# Patient Record
Sex: MALE | Race: WHITE | URBAN - METROPOLITAN AREA
[De-identification: names, ages, dates, MRNs, and addresses within clinical notes are randomized per-mention and may not be internally consistent; named-entity substitution may affect disease eponyms.]

---

## 2021-07-16 ENCOUNTER — OFFICE VISIT (OUTPATIENT)
Dept: ORTHOPEDIC SURGERY | Age: 31
End: 2021-07-16
Payer: MEDICARE

## 2021-07-16 VITALS — HEIGHT: 68 IN | RESPIRATION RATE: 12 BRPM | BODY MASS INDEX: 33.95 KG/M2 | WEIGHT: 224 LBS | TEMPERATURE: 98 F

## 2021-07-16 DIAGNOSIS — T14.8XXA NERVE INJURY: ICD-10-CM

## 2021-07-16 DIAGNOSIS — S93.401A SPRAIN OF RIGHT ANKLE, UNSPECIFIED LIGAMENT, INITIAL ENCOUNTER: Primary | ICD-10-CM

## 2021-07-16 DIAGNOSIS — S93.491A HIGH ANKLE SPRAIN OF RIGHT LOWER EXTREMITY, INITIAL ENCOUNTER: ICD-10-CM

## 2021-07-16 DIAGNOSIS — T14.8XXA NERVE INJURY: Primary | ICD-10-CM

## 2021-07-16 DIAGNOSIS — S93.401A SPRAIN OF RIGHT ANKLE, UNSPECIFIED LIGAMENT, INITIAL ENCOUNTER: ICD-10-CM

## 2021-07-16 PROCEDURE — 4004F PT TOBACCO SCREEN RCVD TLK: CPT | Performed by: ORTHOPAEDIC SURGERY

## 2021-07-16 PROCEDURE — G8428 CUR MEDS NOT DOCUMENT: HCPCS | Performed by: ORTHOPAEDIC SURGERY

## 2021-07-16 PROCEDURE — 99204 OFFICE O/P NEW MOD 45 MIN: CPT | Performed by: ORTHOPAEDIC SURGERY

## 2021-07-16 PROCEDURE — G8417 CALC BMI ABV UP PARAM F/U: HCPCS | Performed by: ORTHOPAEDIC SURGERY

## 2021-07-16 RX ORDER — TRAMADOL HYDROCHLORIDE 50 MG/1
50 TABLET ORAL EVERY 6 HOURS PRN
Qty: 10 TABLET | Refills: 0 | Status: SHIPPED | OUTPATIENT
Start: 2021-07-16 | End: 2021-07-17

## 2021-07-16 NOTE — LETTER
Dr. Cameron Freedman [address]  [phone]        7/16/2021     Patient: Bonnee Saint  YOB: 1990    Dear Man Harding MD,    I had the pleasure of seeing one of your patients, Bonnee Saint recently in the office. Below are the relevant portions of my assessment and plan of care. ASSESSMENT AND PLAN:  He has a right foot/ankle injury, sustained on 7/8/2021. He does have significant physical exam findings, concerning for nerve injury and tendon injury, as well as findings concerning for a possible complex regional pain syndrome. Notably, he has the past medical history as above. He reports a history of tobacco use (smokes about 1 pack of cigarettes per day, as well as pipe/cigar, and e-cigarettes). We had a discussion today about the likely diagnosis and its natural history, physical exam and imaging findings, as well as various treatment options in detail. Surgically, we discussed a possible future surgery, depending on the results of his imaging as below. We did discuss that his risks are higher in the setting of tobacco use, and I did recommend tobacco cessation. Orders/referrals were placed as below at today's visit. The patient will avoid pain provoking activity. The patient was placed into an Ace wrap and a cam boot, and will remain nonweightbearing. In order to know exactly how to proceed with treatment (surgical versus nonsurgical, as well as how), a STAT MRI was ordered today to evaluate his foot/ankle. This is medically necessary to evaluate the structures in this area, for both diagnosis and treatment. We also had a discussion about the risk of blood clot and thromboembolic events.  The patient understands that there is an increased risk with surgery/immobilization, and understands nothing will completely eliminate the risk of DVT/PE's, and that any prophylactic medication does not substitute for early mobilization. Given his risk profile, I have recommended the following strategy to decrease the risk of blood clots, and the patient agrees and wishes to proceed:  Aspirin 325 mg PO q Day. All questions were answered and the above plan was agreed upon. The patient will return to clinic after the MRI has been obtained with right tibia x-rays. At his next visit, we will review his x-rays for a proximal fibula fracture, and review his MRI; depending on the results, we may consider an EMG/nerve conduction study and/or neurology/PM&R/pain management referral to possibly evaluate for an evolving complex regional pain syndrome. I look forward to serving you and your patients again in the future. Please don't hesitate to contact me at my mobile number .         Amira Maloney MD  Orthopedic Surgery

## 2021-07-16 NOTE — PROGRESS NOTES
[facility] [address] [facility]  [facility] Alexus Barroso  [address] [place] [address]  Dept: [phone]    Ambulatory Orthopedic Consult      CHIEF COMPLAINT:    Chief Complaint   Patient presents with    Ankle Pain     right ankle       HISTORY OF PRESENT ILLNESS:      The patient is a 33 y.o. male who is being seen for evaluation of the above, which began 7/8/2021 secondary to a twisting injury while hiking  . At today's visit, he is using a splint/cast.     History is obtained today from:   [x]  the patient     [x]  EMR     []  one family member/friend--     []  multiple family members/friends    []  other:      The patient is accompanied today by his girlfriend, and the entirety of the patient encounter is witnessed by a member of the office staff, Daniel Hale ATC. REVIEW OF SYSTEMS:  Constitutional: Negative for fever. HENT: Negative for tinnitus. Eyes: Negative for pain. Respiratory: Negative for shortness of breath. Cardiovascular: Negative for chest pain. Gastrointestinal: Negative for abdominal pain. Genitourinary: Negative for dysuria. Skin: Negative for rash. Neurological: Negative for headaches. Hematological: Does not bruise/bleed easily. Musculoskeletal: See HPI for pertinent positives     Past Medical History:    He  has a past medical history of Asthma and GERD (gastroesophageal reflux disease). Past Surgical History:    He  has a past surgical history that includes Appendectomy. Current Medications:     Current Outpatient Medications:     esomeprazole Magnesium (NEXIUM) 20 MG PACK, Take 20 mg by mouth daily. Prn, Disp: , Rfl:     sulfacetamide (BLEPH-10) 10 % ophthalmic solution, Place 2 drops into the right eye every 4 hours. , Disp: 10 mL, Rfl: 0    HYDROcodone-acetaminophen (NORCO) 5-325 MG per tablet, Take 1 tablet by mouth every 6 hours as needed for Pain., Disp: 20 tablet, Rfl: 0    ketorolac (ACULAR) 0.5 % ophthalmic solution, Place 1 drop into both eyes 4 times daily. , Disp: 1 Bottle, Rfl: 1     Allergies:    Patient has no known allergies. Family History:  family history is not on file. Social History:   Social History     Occupational History    Not on file   Tobacco Use    Smoking status: Not on file   Substance and Sexual Activity    Alcohol use: Not on file    Drug use: Not on file    Sexual activity: Not on file     Occupation: Not currently working, but reports that he has previously worked as a  and is a      OBJECTIVE:  Temp 98 °F (36.7 °C)   Resp 12   Ht 5' 8\" (1.727 m)   Wt 224 lb (101.6 kg)   BMI 34.06 kg/m²    Psych: alert and oriented to person, time, and place   Cardio:  well perfused extremities, no cyanosis   Resp:  normal respiratory effort  Musculoskeletal:    Affected lower extremity:    Vascular: Limb well perfused, compartments soft/compressible. Skin: No erythema/ulcers. Intact. Superficial abrasion over the lateral hindfoot.   Neurovascular Status: As below  Motion: As below  Tenderness to Palpation: Diffusely throughout the lateral ankle/hindfoot/midfoot/forefoot, diffusely throughout the forefoot from medial to lateral, proximal fibula  -Positive squeeze test for syndesmotic injury  -Decreased sensation to light touch on the plantar aspect of the foot, lateral aspect of the foot, and dorsal aspect of the foot  -Unable to dorsiflex or plantarflex at his ankle (no palpable gap palpated at the Achilles tendon, tibialis anterior is not definitively palpated), unable to actively invert/compa the foot  -Significant pain with minimal passive motion of the foot/ankle  -Foot is held in plantarflexion and inversion, unable to actively return the foot to neutral  -Knee range of motion normal, no tenderness to palpation at the knee      RADIOLOGY:   7/16/2021 FINDINGS:  Three views (AP, Mortise, and Lateral) of the right ankle and three views (AP, Oblique, Lateral) of the right foot were obtained in the office today and reviewed, revealing no acute fracture, dislocation, or radiopaque foreign body/tumor. IMPRESSION:  No acute fracture/dislocation. Electronically signed by Radha Bella MD      Relevant previous imaging reviewed, both imaging and report(s) as below:    No results found. ASSESSMENT AND PLAN:  Body mass index is 34.06 kg/m². He has a right foot/ankle injury, sustained on 7/8/2021. He does have significant physical exam findings, concerning for nerve injury and tendon injury, as well as findings concerning for a possible complex regional pain syndrome. The differential diagnosis I am considering also includes peroneal tendon rupture, syndesmotic injury, severe ankle sprain with superficial peroneal nerve injury, as well as knee injury with common peroneal nerve injury (although this seems much less likely, as he does not have any pain at his knee, with normal motion at this time). Notably, he has the past medical history as above. He reports a history of tobacco use (smokes about 1 pack of cigarettes per day, as well as pipe/cigar, and e-cigarettes). We had a discussion today about the likely diagnosis and its natural history, physical exam and imaging findings, as well as various treatment options in detail. Surgically, we discussed a possible future surgery, depending on the results of his imaging as below. We did discuss that his risks are higher in the setting of tobacco use, and I did recommend tobacco cessation. Orders/referrals were placed as below at today's visit. The patient will avoid pain provoking activity. The patient was placed into an Ace wrap and a cam boot, and will remain nonweightbearing. In order to know exactly how to proceed with treatment (surgical versus nonsurgical, as well as how), a STAT MRI was ordered today to evaluate his foot/ankle.  This is medically necessary to evaluate the structures in this area, for both diagnosis and treatment. We also had a discussion about the risk of blood clot and thromboembolic events. The patient understands that there is an increased risk with surgery/immobilization, and understands nothing will completely eliminate the risk of DVT/PE's, and that any prophylactic medication does not substitute for early mobilization. Given his risk profile, I have recommended the following strategy to decrease the risk of blood clots, and the patient agrees and wishes to proceed:  Aspirin 325 mg PO q Day.     -10 tablets of tramadol were ordered today per the patient's request, for acute pain control. The patient denies ever having had a seizure in the past.    All questions were answered and the above plan was agreed upon. The patient will return to clinic after the MRI has been obtained with right tibia x-rays. At his next visit, we will review his x-rays for a proximal fibula fracture, and review his MRI; depending on the results, we may consider an EMG/nerve conduction study and/or neurology/PM&R/pain management referral to possibly evaluate for an evolving complex regional pain syndrome. At the patient's next visit, depending on how the patient is doing and/or new imaging/labs results, we may consider the following options:    []  Lace up ankle     []  CAM boot         []  removable wrist brace     []  PT:        []  Wean out immobilization         []  Adv activity      []  Footmind        []  Spenco       []  Custom Orthotic:               []  [place] brace                    []  Rocker Bottom      []  Night splint    []  Heel cups        []  Strap        []  Toe gizmos    []  Topl        []  NSAIDs         []  Charly        []  Ref:         []  Stress Xray    []  CT        []  MRI  []  Inj:          []  Consider OR      []  Pick OR date    No follow-ups on file. No orders of the defined types were placed in this encounter.     Orders Placed This Encounter   Procedures    XR ANKLE RIGHT (MIN 3 VIEWS)     WEIGHT BEARING 3 VIEWS:  AP, MORTISE, LATERAL  Please include entire foot     Standing Status:   Future     Number of Occurrences:   1     Standing Expiration Date:   8/16/2021     Order Specific Question:   Reason for exam:     Answer:   eval alignment    XR FOOT RIGHT (MIN 3 VIEWS)     WEIGHTBEARING 3 views: AP, Oblique, Lateral     Standing Status:   Future     Number of Occurrences:   1     Standing Expiration Date:   8/16/2021     Order Specific Question:   Reason for exam:     Answer:   eval alignment    MRI FOOT RIGHT WO CONTRAST     MUST BE SCHEDULED AT [facility]  MUST BE DONE ON 3T MAGNET OR GREATER  Please have read by [facility] Radiologist     Standing Status:   Future     Standing Expiration Date:   7/16/2022     Order Specific Question:   Reason for exam:     Answer:   eval for tendon rupture/syndrome    MRI ANKLE RIGHT WO CONTRAST     MUST BE SCHEDULED AT [facility]  MUST BE DONE ON 3T MAGNET OR GREATER  Please have read by [facility] Radiologist     Standing Status:   Future     Standing Expiration Date:   7/16/2022     Order Specific Question:   Reason for exam:     Answer:   eval for tendon rupture/syndrome    XR EYE FOREIGN BODY     Standing Status:   Future     Standing Expiration Date:   7/16/2022     Order Specific Question:   Reason for exam:     Answer:   3T MRI         Annamarie Alegria MD  Orthopedic Surgery        Please excuse any typos/errors, as this note was created with the assistance of voice recognition software. While intending to generate a document that actually reflects the content of the visit, the document can still have some errors including those of syntax and sound-a-like substitutions which may escape proof reading. In such instances, actual meaning can be extrapolated by context.

## 2021-07-17 RX ORDER — ASPIRIN 325 MG
325 TABLET, DELAYED RELEASE (ENTERIC COATED) ORAL DAILY
Qty: 42 TABLET | Refills: 0 | Status: SHIPPED | OUTPATIENT
Start: 2021-07-17 | End: 2021-08-28

## 2021-07-17 RX ORDER — TRAMADOL HYDROCHLORIDE 50 MG/1
50 TABLET ORAL EVERY 6 HOURS PRN
Qty: 10 TABLET | Refills: 0 | Status: SHIPPED | OUTPATIENT
Start: 2021-07-17 | End: 2021-07-24

## 2021-07-19 ENCOUNTER — HOSPITAL ENCOUNTER (OUTPATIENT)
Dept: GENERAL RADIOLOGY | Age: 31
Discharge: HOME | End: 2021-07-21
Payer: MEDICARE

## 2021-07-19 ENCOUNTER — HOSPITAL ENCOUNTER (OUTPATIENT)
Age: 31
Discharge: HOME | End: 2021-07-21
Payer: MEDICARE

## 2021-07-19 DIAGNOSIS — S93.401A SPRAIN OF RIGHT ANKLE, UNSPECIFIED LIGAMENT, INITIAL ENCOUNTER: ICD-10-CM

## 2021-07-19 DIAGNOSIS — T14.8XXA NERVE INJURY: ICD-10-CM

## 2021-07-19 DIAGNOSIS — S93.491A HIGH ANKLE SPRAIN OF RIGHT LOWER EXTREMITY, INITIAL ENCOUNTER: ICD-10-CM

## 2021-07-19 PROCEDURE — 70030 X-RAY EYE FOR FOREIGN BODY: CPT

## 2021-07-20 DIAGNOSIS — S93.491A HIGH ANKLE SPRAIN OF RIGHT LOWER EXTREMITY, INITIAL ENCOUNTER: Primary | ICD-10-CM

## 2021-07-22 ENCOUNTER — HOSPITAL ENCOUNTER (OUTPATIENT)
Dept: MRI IMAGING | Age: 31
Discharge: HOME | End: 2021-07-24
Payer: MEDICARE

## 2021-07-22 ENCOUNTER — HOSPITAL ENCOUNTER (OUTPATIENT)
Dept: MRI IMAGING | Age: 31
Discharge: HOME | End: 2021-07-21
Payer: MEDICARE

## 2021-07-22 DIAGNOSIS — S93.401A SPRAIN OF RIGHT ANKLE, UNSPECIFIED LIGAMENT, INITIAL ENCOUNTER: ICD-10-CM

## 2021-07-22 DIAGNOSIS — T14.8XXA NERVE INJURY: ICD-10-CM

## 2021-07-22 DIAGNOSIS — S93.491A HIGH ANKLE SPRAIN OF RIGHT LOWER EXTREMITY, INITIAL ENCOUNTER: ICD-10-CM

## 2021-07-22 PROCEDURE — 73718 MRI LOWER EXTREMITY W/O DYE: CPT

## 2021-07-22 PROCEDURE — 73721 MRI JNT OF LWR EXTRE W/O DYE: CPT

## 2021-07-23 ENCOUNTER — OFFICE VISIT (OUTPATIENT)
Dept: ORTHOPEDIC SURGERY | Age: 31
End: 2021-07-23
Payer: MEDICARE

## 2021-07-23 VITALS — BODY MASS INDEX: 33.95 KG/M2 | HEIGHT: 68 IN | TEMPERATURE: 97.4 F | RESPIRATION RATE: 12 BRPM | WEIGHT: 224 LBS

## 2021-07-23 DIAGNOSIS — S93.401D SPRAIN OF RIGHT ANKLE, UNSPECIFIED LIGAMENT, SUBSEQUENT ENCOUNTER: ICD-10-CM

## 2021-07-23 DIAGNOSIS — S92.101D CLOSED NONDISPLACED FRACTURE OF RIGHT TALUS WITH ROUTINE HEALING, UNSPECIFIED PORTION OF TALUS, SUBSEQUENT ENCOUNTER: Primary | ICD-10-CM

## 2021-07-23 DIAGNOSIS — T14.8XXA NERVE INJURY: ICD-10-CM

## 2021-07-23 PROCEDURE — G8427 DOCREV CUR MEDS BY ELIG CLIN: HCPCS | Performed by: ORTHOPAEDIC SURGERY

## 2021-07-23 PROCEDURE — G8417 CALC BMI ABV UP PARAM F/U: HCPCS | Performed by: ORTHOPAEDIC SURGERY

## 2021-07-23 PROCEDURE — 4004F PT TOBACCO SCREEN RCVD TLK: CPT | Performed by: ORTHOPAEDIC SURGERY

## 2021-07-23 PROCEDURE — 99214 OFFICE O/P EST MOD 30 MIN: CPT | Performed by: ORTHOPAEDIC SURGERY

## 2021-07-23 NOTE — PROGRESS NOTES
[facility]  [facility]  [address] [place] [address]  Dept: [phone]    Ambulatory Orthopedic Consult      CHIEF COMPLAINT:    Chief Complaint   Patient presents with    Foot Pain     right       HISTORY OF PRESENT ILLNESS:      The patient is a 33 y.o. male who is being seen for evaluation of the above, which began 7/8/2021 secondary to a twisting injury while hiking  . At today's visit, he is using a splint/cast.     History is obtained today from:   [x]  the patient     [x]  EMR     []  one family member/friend--     []  multiple family members/friends    []  other:      The patient is accompanied today by his girlfriend, and the entirety of the patient encounter is witnessed by a member of the office staff, Shar Baker ATC. INTERVAL HISTORY 7/23/2021:  He is seen again today in the office for follow up of imaging as below. Since being seen last, the patient is doing better. At today's visit, he is using a brace/boot. History is obtained today from:   [x]  the patient     [x]  EMR     []  one family member/friend    []  multiple family members/friends    []  other: The entirety of the patient encounter is witnessed by a member of the office staff, Marvin Cee RN. The patient denies any new/worsening back pain or thigh pain. REVIEW OF SYSTEMS:  Constitutional: Negative for fever. HENT: Negative for tinnitus. Eyes: Negative for pain. Respiratory: Negative for shortness of breath. Cardiovascular: Negative for chest pain. Gastrointestinal: Negative for abdominal pain. Genitourinary: Negative for dysuria. Skin: Negative for rash. Neurological: Negative for headaches. Hematological: Does not bruise/bleed easily. Musculoskeletal: See HPI for pertinent positives     Past Medical History:    He  has a past medical history of Asthma and GERD (gastroesophageal reflux disease).      Past Surgical History: mortise is maintained with no widening of the clear spaces. Overall alignment is satisfactory. IMPRESSION:  No acute fracture/dislocation. Electronically signed by Katlyn Colvin MD          FINDINGS:  Three views (AP, Mortise, and Lateral) of the right ankle and three views (AP, Oblique, Lateral) of the right foot were obtained in the office today and reviewed, revealing no acute fracture, dislocation, or radiopaque foreign body/tumor. IMPRESSION:  No acute fracture/dislocation. Electronically signed by Katlyn Colvin MD      Relevant previous imaging reviewed, both imaging and report(s) as below:    No results found. ASSESSMENT AND PLAN:  Body mass index is 34.06 kg/m². He has a right foot/ankle injury with an ankle sprain and probable microtrabecular fracture of the talar neck (noted on MRI), and possible nerve injury, sustained on 7/8/2021. He does have significant physical exam findings concerning for nerve injury, and we discussed this at today's visit. The differential diagnosis I am considering also includes complex regional pain syndrome, peroneal tendon rupture, syndesmotic injury, severe ankle sprain with superficial peroneal nerve injury, as well as knee injury with common peroneal nerve injury (although this seems much less likely, as he does not have any pain at his knee, with normal motion at this time). Notably, he has the past medical history as above. He reports a history of tobacco use (smokes about 1 pack of cigarettes per day, as well as pipe/cigar, and e-cigarettes). We had a discussion today about the likely diagnosis and its natural history, physical exam and imaging findings, as well as various treatment options in detail. Surgically, I did not recommend any surgical invention at this time, and recommended conservative management. Orders/referrals were placed as below at today's visit.   I did recommend nonweightbearing, in his cam boot, and we discussed the risks of fracture displacement, and discussed treating his talus injury like a nondisplaced talus fracture at this point. He was cautioned to avoid pain provoking activity. He will continue taking aspirin 325 mg p.o. daily for DVT prophylaxis.    -He has previously been prescribed tramadol for acute pain. I am concerned about his inability to move his foot, and for this reason the patient was referred to neurology and to my partner who specializes in orthospine, Dr. Courtney Edwards, for help and evaluation of his problem. All questions were answered and the above plan was agreed upon. The patient will return to clinic in 3 weeks without x-rays. At his next visit, we may consider an EMG/NCS to evaluate his nerves, and follow-up on the results of his referrals as above. At the patient's next visit, depending on how the patient is doing and/or new imaging/labs results, we may consider the following options:    []  Lace up ankle     []  CAM boot         []  removable wrist brace     []  PT:        []  Wean out immobilization         []  Adv activity      []  Footmind        []  Spenco       []  Custom Orthotic:               []  [place] brace                    []  Rocker Bottom      []  Night splint    []  Heel cups        []  Strap        []  Toe gizmos    []  Topl        []  NSAIDs         []  Charly        []  Ref:         []  Stress Xray    []  CT        []  MRI  []  Inj:          []  Consider OR      []  Pick OR date    No follow-ups on file. No orders of the defined types were placed in this encounter.     Orders Placed This Encounter   Procedures   José Antonio Barone MD, Orthopedic Surgery, [place]     Referral Priority:   Routine     Referral Type:   Eval and Treat     Referral Reason:   Specialty Services Required     Referred to Provider:   Aimee Aguillon MD     Requested Specialty:   Orthopedic Surgery     Number of Visits Requested:   1    Ambulatory referral to Physical Therapy     Referral Priority:   Routine     Referral Type:   Eval and Treat     Referral Reason:   Specialty Services Required     Requested Specialty:   Physical Therapy     Number of Visits Requested:   1         Wu Forrester MD  Orthopedic Surgery        Please excuse any typos/errors, as this note was created with the assistance of voice recognition software. While intending to generate a document that actually reflects the content of the visit, the document can still have some errors including those of syntax and sound-a-like substitutions which may escape proof reading. In such instances, actual meaning can be extrapolated by context.

## 2021-08-19 ENCOUNTER — CLINICAL DOCUMENTATION (OUTPATIENT)
Dept: ORTHOPEDIC SURGERY | Age: 31
End: 2021-08-19

## 2021-08-20 NOTE — PROGRESS NOTES
The patient was scheduled for an appointment on 8/19/2021, and he did not show up for his appointment. I called the patient at home and spoke with him, and he stated that he was doing better. I did state that I am still concerned about his injury, and explained that it is still potentially time sensitive, and I did want him to come into the office to be seen as soon as possible, and he states he will follow-up as soon as he is able.

## 2021-09-03 ENCOUNTER — TELEPHONE (OUTPATIENT)
Dept: ORTHOPEDIC SURGERY | Age: 31
End: 2021-09-03